# Patient Record
(demographics unavailable — no encounter records)

---

## 2020-12-27 NOTE — NUR
ER DISCHARGE NOTE:



Patient is cleared to be discharged per ERMD, pt is aox4, on room air, with 
stable vital signs. pt was given dc and prescription instructions, pt was able 
to verbalize understanding, pt id band  removed without complications. pt is 
able to ambulate with steady gait. pt took all belongings.

## 2020-12-27 NOTE — EMERGENCY ROOM REPORT
History of Present Illness


General


Chief Complaint:  Toothache


Source:  Patient





Present Illness


HPI


30-year-old female with reportedly no past medical history presents today with 

toothache after she chipped her tooth 2 days ago.  Patient states the pain is 

worsened in severity.  She has an appointment to see her dentist in 8 days.  

Location is right upper tooth, severity is moderate, quality is throbbing, timin

g is 2 days ago.  Patient states she has tried Advil with no relief.  No other 

associated signs or symptoms. 





PMH: [Denies]


PSH: [Denies]


Smoking: [Denies]


Ethanol: [Denies]


Drug: [Denies]


Allergies:  


Coded Allergies:  


     No Known Allergies (Unverified , 3/3/18)





COVID-19 Screening


Contact w/high risk pt:  No


Experienced COVID-19 symptoms?:  No


COVID-19 Testing performed PTA:  No





Patient History


Last Menstrual Period:  12/20





Nursing Documentation-PMH


Past Medical History:  No Stated History





Review of Systems


Narrative


Review of systems:


CONST: No fevers or chills, No night sweats


EYES: No eye pain, vision change, eye discharge


HEAD/EARS/NOSE/THROAT: +tooth pain, No earache, sore throat, or nasal discharge.


PULMONARY: No SOB, no cough, no wheezing


CARDIAC: No chest pain, No palpitations, no leg swelling


GI: No abdominal pain, no vomiting, no diarrhea , no melena or BRBPR 


: No flank pain, no dysuria, no hematuria, no frequency. 


MUSCULOSKELETAL: No back pain, no neck pain, no leg pain


SKIN:  No rash, no itching, no bruising


NEUROLOGICAL: No headache, no dizziness, no paresthesia , no focal weakness. 


14 point Review of Systems is otherwise negative except per HPI





Physical Exam





Vital Signs








  Date Time  Temp Pulse Resp B/P (MAP) Pulse Ox O2 Delivery O2 Flow Rate FiO2


 


12/27/20 18:39 98.1 97 19 137/79 (98) 98 Room Air  








Other Organ Systems


Physical Exam:


GENERAL: Awake, alert, nontoxic, in no acute distress


EYES: EOMI, conjunctiva without pallor 


HEAD/EARS/NOSE/THROAT: right sided upper tooth with avulsion with surrounding 

tenderness. No abscess formation.  NCAT, oral mucosa moist, external nose and 

ear normal in appearance, oropharynx clear, no swelling or exudates. 


NECK: supple, nontender, no spasm, no JVD, no cervical adenopathy


RESPIRATORY: no stridor, effort normal, no retractions, no accessory muscle use,

 BS clear bilaterally, no wheezes, no rhonchi, no rales, no rub. 


CARDIOVASCULAR: RRR, normal S1 S2, no murmur, no peripheral edema


ABDOMINAL /GI: soft, nondistended, nontender, BS normal, no masses, no guarding,

 no Mcburneys point tenderness, no rebound, no Murpheys sign


: No CVA tenderness


MUSCULOSKELETAL:  All extremities are non-tender, no swelling, FROM, normal 

strength, normal sensation, cap refill <2 seconds in all extremities


EXTREMITIES: no leg swelling, pulses: 2+ brisk and normal in all distal 

extremities


SKIN:  No rash, skin is warm and dry. No cyanosis, no pallor


NEUROLOGICAL: awake, alert and appropriate, oriented x3, speech normal, motor 

and sensation grossly intact


PSYCHIATRIC: Normal mood, normal affect





Medical Decision Making


PA Attestation


 [Homero] Is my supervising Physician whom patient management has been 

discussed with.


Diagnostic Impression:  


   Primary Impression:  


   Toothache


   Additional Impression:  


   Dental infection


ER Course


Pt. presents to the ED c/o tooth ache beginning 2 days ago





Ddx considered but are not limited to toothache, dental abscess, tooth avulsion,

 gingival infection





Vital signs: are WNL, pt. is afebrile


H&PE are most consistent with 





ORDERS: none required at this time, the diagnosis is clinical





ED INTERVENTIONS AND MDM : Pt was given toradol while here in ER.  Given patient

 states the pain has worsened in severity, recommend we tx with antibiotic to 

cover for possible dental abscess formation however there is no evidence of 

severe dental abscess currently on exam.  Patient was advised to continue 

following up with her dentist in 8 days as planned.  Strict ER return 

precautions given.  Patient is able tolerate p.o. no evidence of airway 

obstruction.  She was advised to take Tylenol and ibuprofen together at home as 

needed for pain.





DISCHARGE: At this time pt. is stable for d/c to home. Will provide printed 

patient care instructions, and any necessary prescriptions. Care plan and follow

 up instructions have been discussed with the patient prior to discharge.





Last Vital Signs








  Date Time  Temp Pulse Resp B/P (MAP) Pulse Ox O2 Delivery O2 Flow Rate FiO2


 


12/27/20 18:43 98.1  19 137/79 98 Room Air  


 


12/27/20 18:39  97      








Status:  improved


Disposition:  HOME, SELF-CARE


Condition:  Stable


Scripts


Amoxicillin/Potassium Clav 875-125* (AUGMENTIN 875-125 TABLET*) 1 Each Tablet


1 TAB ORAL TWICE A DAY for 7 Days, #14 TAB


   Prov: Jossy Edmnod PA-C         12/27/20


Referrals:  


[facility]





[facility]


Patient Instructions:  Dental Abscess, Dental Pain





Additional Instructions:  


Take over-the-counter Tylenol and ibuprofen at the same time every 8 hours for 

pain.  Take antibiotics as prescribed.





Follow up with a Primary Care Provider in 1-2 days, even if your symptoms have 

resolved.  Follow-up with your dentist as scheduled.





Return sooner to ED if new symptoms occur, or current symptoms become worse. 








- Please note that this Emergency Department Report was dictated using [employer] technology software, occasionally this can lead to 

erroneous entry secondary to interpretation by the dictation equipment.











Jossy Edmond PA-C            Dec 27, 2020 18:57

## 2020-12-27 NOTE — NUR
ED Nurse Note:



pt presents to ED c/o "sharp, throbbing" tooth pain from a broken tooth on the 
R side of her mouth. pt states that her gums are swollen and it has been 
painful for 2 days. pt reports having a dentist appt on 1/4/2020